# Patient Record
Sex: FEMALE | Race: WHITE | NOT HISPANIC OR LATINO | Employment: FULL TIME | URBAN - METROPOLITAN AREA
[De-identification: names, ages, dates, MRNs, and addresses within clinical notes are randomized per-mention and may not be internally consistent; named-entity substitution may affect disease eponyms.]

---

## 2017-10-06 ENCOUNTER — APPOINTMENT (OUTPATIENT)
Dept: WOMENS IMAGING | Facility: HOSPITAL | Age: 57
End: 2017-10-06

## 2017-10-06 PROCEDURE — 77067 SCR MAMMO BI INCL CAD: CPT | Performed by: RADIOLOGY

## 2017-10-06 PROCEDURE — G0202 SCR MAMMO BI INCL CAD: HCPCS | Performed by: RADIOLOGY

## 2018-10-10 ENCOUNTER — APPOINTMENT (OUTPATIENT)
Dept: WOMENS IMAGING | Facility: HOSPITAL | Age: 58
End: 2018-10-10

## 2018-10-10 PROCEDURE — 77067 SCR MAMMO BI INCL CAD: CPT | Performed by: RADIOLOGY

## 2018-12-17 VITALS
HEART RATE: 73 BPM | RESPIRATION RATE: 22 BRPM | RESPIRATION RATE: 20 BRPM | DIASTOLIC BLOOD PRESSURE: 71 MMHG | SYSTOLIC BLOOD PRESSURE: 115 MMHG | RESPIRATION RATE: 15 BRPM | DIASTOLIC BLOOD PRESSURE: 59 MMHG | DIASTOLIC BLOOD PRESSURE: 60 MMHG | SYSTOLIC BLOOD PRESSURE: 133 MMHG | HEART RATE: 100 BPM | RESPIRATION RATE: 16 BRPM | OXYGEN SATURATION: 97 % | SYSTOLIC BLOOD PRESSURE: 110 MMHG | TEMPERATURE: 97.6 F | DIASTOLIC BLOOD PRESSURE: 80 MMHG | RESPIRATION RATE: 18 BRPM | DIASTOLIC BLOOD PRESSURE: 57 MMHG | DIASTOLIC BLOOD PRESSURE: 70 MMHG | RESPIRATION RATE: 17 BRPM | OXYGEN SATURATION: 96 % | HEART RATE: 80 BPM | WEIGHT: 152 LBS | SYSTOLIC BLOOD PRESSURE: 113 MMHG | SYSTOLIC BLOOD PRESSURE: 95 MMHG | HEIGHT: 62 IN | HEART RATE: 79 BPM | HEART RATE: 105 BPM | SYSTOLIC BLOOD PRESSURE: 122 MMHG | OXYGEN SATURATION: 100 % | HEART RATE: 78 BPM | OXYGEN SATURATION: 98 % | HEART RATE: 82 BPM | DIASTOLIC BLOOD PRESSURE: 54 MMHG | SYSTOLIC BLOOD PRESSURE: 99 MMHG | DIASTOLIC BLOOD PRESSURE: 73 MMHG

## 2018-12-18 PROBLEM — Z12.11 SCREENING FOR COLONIC NEOPLASIA: Status: ACTIVE | Noted: 2018-12-19

## 2018-12-19 ENCOUNTER — OFFICE (AMBULATORY)
Dept: URBAN - METROPOLITAN AREA PATHOLOGY 4 | Facility: PATHOLOGY | Age: 58
End: 2018-12-19

## 2018-12-19 ENCOUNTER — AMBULATORY SURGICAL CENTER (AMBULATORY)
Dept: URBAN - METROPOLITAN AREA SURGERY 17 | Facility: SURGERY | Age: 58
End: 2018-12-19

## 2018-12-19 DIAGNOSIS — D12.5 BENIGN NEOPLASM OF SIGMOID COLON: ICD-10-CM

## 2018-12-19 DIAGNOSIS — Z12.11 ENCOUNTER FOR SCREENING FOR MALIGNANT NEOPLASM OF COLON: ICD-10-CM

## 2018-12-19 DIAGNOSIS — K64.8 OTHER HEMORRHOIDS: ICD-10-CM

## 2018-12-19 LAB
GI HISTOLOGY: A. UNSPECIFIED: (no result)
GI HISTOLOGY: PDF REPORT: (no result)

## 2018-12-19 PROCEDURE — 45385 COLONOSCOPY W/LESION REMOVAL: CPT | Mod: 33 | Performed by: INTERNAL MEDICINE

## 2018-12-19 PROCEDURE — 88305 TISSUE EXAM BY PATHOLOGIST: CPT | Mod: 33 | Performed by: INTERNAL MEDICINE

## 2019-12-04 ENCOUNTER — APPOINTMENT (OUTPATIENT)
Dept: WOMENS IMAGING | Facility: HOSPITAL | Age: 59
End: 2019-12-04

## 2019-12-04 PROCEDURE — 77067 SCR MAMMO BI INCL CAD: CPT | Performed by: RADIOLOGY

## 2019-12-04 PROCEDURE — 77063 BREAST TOMOSYNTHESIS BI: CPT | Performed by: RADIOLOGY

## 2020-03-13 ENCOUNTER — OFFICE VISIT (OUTPATIENT)
Dept: ORTHOPEDIC SURGERY | Facility: CLINIC | Age: 60
End: 2020-03-13

## 2020-03-13 VITALS — TEMPERATURE: 98.5 F | HEIGHT: 62 IN | WEIGHT: 176.4 LBS | BODY MASS INDEX: 32.46 KG/M2

## 2020-03-13 DIAGNOSIS — M75.01 ADHESIVE CAPSULITIS OF BOTH SHOULDERS: ICD-10-CM

## 2020-03-13 DIAGNOSIS — M25.512 BILATERAL SHOULDER PAIN, UNSPECIFIED CHRONICITY: Primary | ICD-10-CM

## 2020-03-13 DIAGNOSIS — M75.02 ADHESIVE CAPSULITIS OF BOTH SHOULDERS: ICD-10-CM

## 2020-03-13 DIAGNOSIS — M25.511 BILATERAL SHOULDER PAIN, UNSPECIFIED CHRONICITY: Primary | ICD-10-CM

## 2020-03-13 PROCEDURE — 20610 DRAIN/INJ JOINT/BURSA W/O US: CPT | Performed by: ORTHOPAEDIC SURGERY

## 2020-03-13 PROCEDURE — 99214 OFFICE O/P EST MOD 30 MIN: CPT | Performed by: ORTHOPAEDIC SURGERY

## 2020-03-13 PROCEDURE — 73030 X-RAY EXAM OF SHOULDER: CPT | Performed by: ORTHOPAEDIC SURGERY

## 2020-03-13 RX ORDER — LIDOCAINE HYDROCHLORIDE 20 MG/ML
4 INJECTION, SOLUTION EPIDURAL; INFILTRATION; INTRACAUDAL; PERINEURAL
Status: COMPLETED | OUTPATIENT
Start: 2020-03-13 | End: 2020-03-13

## 2020-03-13 RX ORDER — MELOXICAM 15 MG/1
TABLET ORAL
Qty: 30 TABLET | Refills: 0 | Status: SHIPPED | OUTPATIENT
Start: 2020-03-13 | End: 2020-04-15 | Stop reason: SDUPTHER

## 2020-03-13 RX ORDER — METHYLPREDNISOLONE ACETATE 80 MG/ML
80 INJECTION, SUSPENSION INTRA-ARTICULAR; INTRALESIONAL; INTRAMUSCULAR; SOFT TISSUE
Status: COMPLETED | OUTPATIENT
Start: 2020-03-13 | End: 2020-03-13

## 2020-03-13 RX ORDER — LANOLIN ALCOHOL/MO/W.PET/CERES
1000 CREAM (GRAM) TOPICAL DAILY
COMMUNITY

## 2020-03-13 RX ADMIN — METHYLPREDNISOLONE ACETATE 80 MG: 80 INJECTION, SUSPENSION INTRA-ARTICULAR; INTRALESIONAL; INTRAMUSCULAR; SOFT TISSUE at 09:55

## 2020-03-13 RX ADMIN — METHYLPREDNISOLONE ACETATE 80 MG: 80 INJECTION, SUSPENSION INTRA-ARTICULAR; INTRALESIONAL; INTRAMUSCULAR; SOFT TISSUE at 09:56

## 2020-03-13 RX ADMIN — LIDOCAINE HYDROCHLORIDE 4 ML: 20 INJECTION, SOLUTION EPIDURAL; INFILTRATION; INTRACAUDAL; PERINEURAL at 09:56

## 2020-03-13 RX ADMIN — LIDOCAINE HYDROCHLORIDE 4 ML: 20 INJECTION, SOLUTION EPIDURAL; INFILTRATION; INTRACAUDAL; PERINEURAL at 09:55

## 2020-03-13 NOTE — PROGRESS NOTES
New Bilateral Shoulder      Patient: Olga Laguna        YOB: 1960    Medical Record Number: [id number]        Chief Complaints: bilateral shoulders       History of Present Illness: This is a 59-year-old female who presents complaining of bilateral shoulder pain left is worse than right she is left-hand dominant they have been ongoing for 6 months no history injury change in activity no history of similar symptoms she does have night pain she cannot begin to get her bra on no history of similar symptoms symptoms are moderate intermittent aching burning worse with range of motion somewhat better with rest she is a manager of a dental office past medical history is unremarkable      Allergies: No Known Allergies    Medications:   Home Medications:  Current Outpatient Medications on File Prior to Visit   Medication Sig   • Cholecalciferol (VITAMIN D3) 125 MCG (5000 UT) capsule capsule Take 5,000 Units by mouth Daily.   • vitamin B-12 (CYANOCOBALAMIN) 1000 MCG tablet Take 1,000 mcg by mouth Daily.   • Codeine Polt-Chlorphen Polt ER 14.7-2.8 MG/5ML Suspension Extended Release Take 10 mL by mouth 2 (Two) Times a Day As Needed (COUGH).     No current facility-administered medications on file prior to visit.      Current Medications:  Scheduled Meds:  Continuous Infusions:  No current facility-administered medications for this visit.   PRN Meds:.    History reviewed. No pertinent past medical history.   History reviewed. No pertinent surgical history.     Social History     Occupational History   • Not on file   Tobacco Use   • Smoking status: Never Smoker   Substance and Sexual Activity   • Alcohol use: No   • Drug use: No   • Sexual activity: Defer      Social History     Social History Narrative   • Not on file      History reviewed. No pertinent family history.          Review of Systems: 14 point review of systems are remarkable for the pertinent positives listed in the chart by the patient the  "remainder negative    Review of Systems      Physical Exam: 59 y.o. female  General Appearance:    Alert, cooperative, in no acute distress                   Vitals:    03/13/20 0923   Temp: 98.5 °F (36.9 °C)   Weight: 80 kg (176 lb 6.4 oz)   Height: 157.5 cm (62\")      Patient is alert and read ×3 no acute distress appears her above-listed at height weight and age.  Affect is normal respiratory rate is normal unlabored. Heart rate regular rate rhythm, sclera, dentition and hearing are normal for the purpose of this exam.    Ortho Exam  Physical exam of the right shoulder reveals no overlying skin changes no lymphedema no lymphadenopathy.  Patient has active flexion 170 with mild symptoms abduction is similar external rotation is to 45 and internal rotation to the lower lumbar spine with mild symptoms.  Patient has good rotator cuff strength 4+ over 5 with isometric strength testing with pain.  Patient has a positive impingement and a positive Smart sign.  Patient has good cervical range of motion which is full and asymptomatic no radicular symptoms.  Patient has a normal elbow exam.  Good distal pulses are present  Patient has pain with overhead activity and a positive Neer sign and a positive empty can sign , a positive drop arm and a definitive painful arc    Physical exam of the left shoulder reveals no overlying skin changes no lymphedema no lymphadenopathy.  Patient has active flexion 160 with mild symptoms abduction is similar external rotation is to 35 and internal rotation to the left buttock with mild symptoms.  Patient has good rotator cuff strength 4+ over 5 with isometric strength testing with pain.  Patient has a positive impingement and a positive Smart sign.  Patient has good cervical range of motion which is full and asymptomatic no radicular symptoms.  Patient has a normal elbow exam.  Good distal pulses are presentPatient has pain with overhead activity and a positive Neer sign and a positive " empty can sign  They have a positive drop arm any definitive painful arc    Large Joint Arthrocentesis: L glenohumeral  Date/Time: 3/13/2020 9:55 AM  Consent given by: patient  Site marked: site marked  Timeout: Immediately prior to procedure a time out was called to verify the correct patient, procedure, equipment, support staff and site/side marked as required   Supporting Documentation  Indications: pain   Procedure Details  Location: shoulder - L glenohumeral  Preparation: Patient was prepped and draped in the usual sterile fashion  Needle size: 22 G  Approach: posterior  Medications administered: 80 mg methylPREDNISolone acetate 80 MG/ML; 4 mL lidocaine PF 2% 2 %  Patient tolerance: patient tolerated the procedure well with no immediate complications    Large Joint Arthrocentesis: R glenohumeral  Date/Time: 3/13/2020 9:56 AM  Consent given by: patient  Site marked: site marked  Timeout: Immediately prior to procedure a time out was called to verify the correct patient, procedure, equipment, support staff and site/side marked as required   Supporting Documentation  Indications: pain   Procedure Details  Location: shoulder - R glenohumeral  Preparation: Patient was prepped and draped in the usual sterile fashion  Needle size: 22 G  Approach: posterior  Medications administered: 4 mL lidocaine PF 2% 2 %; 80 mg methylPREDNISolone acetate 80 MG/ML  Patient tolerance: patient tolerated the procedure well with no immediate complications                Radiology:   AP, Scapular Y and Axillary Lateral of the right and left shoulder were ordered/reviewed to evauate shoulder pain.  I have no comparative films she has some mild acromioclavicular arthritis otherwise no acute bony pathology is seen  Imaging Results (Most Recent)     Procedure Component Value Units Date/Time    XR Shoulder 2+ View Bilateral [[id number]] Resulted:  03/13/20 0900     Updated:  03/13/20 0911    Impression:       Ordering physician's impression is  located in the Encounter Note dated 03/13/20. X-ray performed in the DR room.          Assessment/Plan: Bilateral shoulder pain I think this is frozen shoulder the left is worse than the right had a long discussion with her regarding the pathology.  Her sister has had a similar problem so she kind of does understand this.  Plan is to proceed with an injection of both I will start her on meloxicam with strict precautions for short period of time and have her see physical therapy.  I will see her back for 5 weeks  Cortisone Injection. See procedure note.  Cortisone Injection for DIAGNOSTIC and THERAPUTIC purposes.

## 2020-04-15 RX ORDER — MELOXICAM 15 MG/1
TABLET ORAL
Qty: 30 TABLET | Refills: 0 | Status: SHIPPED | OUTPATIENT
Start: 2020-04-15 | End: 2021-08-05

## 2021-02-05 ENCOUNTER — IMMUNIZATION (OUTPATIENT)
Dept: VACCINE CLINIC | Facility: HOSPITAL | Age: 61
End: 2021-02-05

## 2021-02-05 ENCOUNTER — APPOINTMENT (OUTPATIENT)
Dept: WOMENS IMAGING | Facility: HOSPITAL | Age: 61
End: 2021-02-05

## 2021-02-05 PROCEDURE — 0001A: CPT | Performed by: INTERNAL MEDICINE

## 2021-02-05 PROCEDURE — 91300 HC SARSCOV02 VAC 30MCG/0.3ML IM: CPT | Performed by: INTERNAL MEDICINE

## 2021-02-05 PROCEDURE — 77067 SCR MAMMO BI INCL CAD: CPT | Performed by: RADIOLOGY

## 2021-02-05 PROCEDURE — 77063 BREAST TOMOSYNTHESIS BI: CPT | Performed by: RADIOLOGY

## 2021-02-26 ENCOUNTER — IMMUNIZATION (OUTPATIENT)
Dept: VACCINE CLINIC | Facility: HOSPITAL | Age: 61
End: 2021-02-26

## 2021-02-26 PROCEDURE — 91300 HC SARSCOV02 VAC 30MCG/0.3ML IM: CPT | Performed by: INTERNAL MEDICINE

## 2021-02-26 PROCEDURE — 0002A: CPT | Performed by: INTERNAL MEDICINE

## 2021-03-19 ENCOUNTER — APPOINTMENT (OUTPATIENT)
Dept: WOMENS IMAGING | Facility: HOSPITAL | Age: 61
End: 2021-03-19

## 2021-03-19 PROCEDURE — 76641 ULTRASOUND BREAST COMPLETE: CPT | Performed by: RADIOLOGY

## 2021-03-19 PROCEDURE — 77061 BREAST TOMOSYNTHESIS UNI: CPT | Performed by: RADIOLOGY

## 2021-03-19 PROCEDURE — 77065 DX MAMMO INCL CAD UNI: CPT | Performed by: RADIOLOGY

## 2021-03-19 PROCEDURE — G0279 TOMOSYNTHESIS, MAMMO: HCPCS | Performed by: RADIOLOGY

## 2021-04-05 ENCOUNTER — APPOINTMENT (OUTPATIENT)
Dept: WOMENS IMAGING | Facility: HOSPITAL | Age: 61
End: 2021-04-05

## 2021-04-05 PROCEDURE — 19082 BX BREAST ADD LESION STRTCTC: CPT | Performed by: RADIOLOGY

## 2021-04-05 PROCEDURE — 19081 BX BREAST 1ST LESION STRTCTC: CPT | Performed by: RADIOLOGY

## 2021-04-08 ENCOUNTER — TELEPHONE (OUTPATIENT)
Dept: SURGERY | Facility: CLINIC | Age: 61
End: 2021-04-08

## 2021-04-08 NOTE — TELEPHONE ENCOUNTER
Scheduled New Pt appointment with  6-11-21  Arrive 9:00am    Mailed Paper work    LM on patient's phone.    Mony

## 2021-04-28 ENCOUNTER — TELEPHONE (OUTPATIENT)
Dept: SURGERY | Facility: CLINIC | Age: 61
End: 2021-04-28

## 2021-04-28 ENCOUNTER — TRANSCRIBE ORDERS (OUTPATIENT)
Dept: SURGERY | Facility: CLINIC | Age: 61
End: 2021-04-28

## 2021-04-28 DIAGNOSIS — R92.8 ABNORMAL FINDING ON BREAST IMAGING: Primary | ICD-10-CM

## 2021-04-28 NOTE — TELEPHONE ENCOUNTER
Scheduled Right Breast limited U/S at [facility]   5-3-21 at 9:30    Return to see Dr LAY/ [facility] PT 5-6-21 arrive 12:00    Spoke to pt leslee Sykes

## 2021-04-30 ENCOUNTER — TELEPHONE (OUTPATIENT)
Dept: SURGERY | Facility: CLINIC | Age: 61
End: 2021-04-30

## 2021-04-30 ENCOUNTER — PREP FOR SURGERY (OUTPATIENT)
Dept: OTHER | Facility: HOSPITAL | Age: 61
End: 2021-04-30

## 2021-04-30 ENCOUNTER — TRANSCRIBE ORDERS (OUTPATIENT)
Dept: SURGERY | Facility: CLINIC | Age: 61
End: 2021-04-30

## 2021-04-30 ENCOUNTER — APPOINTMENT (OUTPATIENT)
Dept: WOMENS IMAGING | Facility: HOSPITAL | Age: 61
End: 2021-04-30

## 2021-04-30 DIAGNOSIS — R92.8 ABNORMAL FINDING ON BREAST IMAGING: Primary | ICD-10-CM

## 2021-04-30 DIAGNOSIS — L02.91 ABSCESS: Primary | ICD-10-CM

## 2021-04-30 PROCEDURE — 76642 ULTRASOUND BREAST LIMITED: CPT | Performed by: RADIOLOGY

## 2021-04-30 PROCEDURE — 19000 PUNCTURE ASPIR CYST BREAST: CPT | Performed by: RADIOLOGY

## 2021-04-30 NOTE — TELEPHONE ENCOUNTER
----- Message from Mony Lopez sent at 4/30/2021  8:11 AM EDT -----  Regarding: New PT  Dr. Clara Pallares referred a patient with a Right Radial Scar, we are seeing her next Wednesday, after a Right Breast US at [facility] on Monday 5-3-21    Dr Pallares said patient called her this morning in severe pain, wanted to know if we could reach out to the patient and get her seen sooner.Dr.Pallares said she has not seen the patient for this issue. She would like 's MA to call pt.    I have moved up patient's Right Breast US at [facility] for today at 10:30  I have left patient a message to call us.          I've left message for patient to please call me back. I've also left message with Dr. Pallares's office trying to reach patient, getting voicemail.   l

## 2021-04-30 NOTE — TELEPHONE ENCOUNTER
Moved up Right Breast US at [facility]  4-30-21 AT 10:30    LM X 2  For pt to call.    Mony

## 2021-05-03 ENCOUNTER — PREP FOR SURGERY (OUTPATIENT)
Dept: OTHER | Facility: HOSPITAL | Age: 61
End: 2021-05-03

## 2021-05-03 ENCOUNTER — OFFICE VISIT (OUTPATIENT)
Dept: SURGERY | Facility: CLINIC | Age: 61
End: 2021-05-03

## 2021-05-03 ENCOUNTER — TELEPHONE (OUTPATIENT)
Dept: SURGERY | Facility: CLINIC | Age: 61
End: 2021-05-03

## 2021-05-03 VITALS
TEMPERATURE: 97.5 F | WEIGHT: 172 LBS | DIASTOLIC BLOOD PRESSURE: 74 MMHG | SYSTOLIC BLOOD PRESSURE: 128 MMHG | HEIGHT: 62 IN | BODY MASS INDEX: 31.65 KG/M2 | HEART RATE: 80 BPM | OXYGEN SATURATION: 98 %

## 2021-05-03 DIAGNOSIS — N64.89 RADIAL SCAR OF RIGHT BREAST: Primary | ICD-10-CM

## 2021-05-03 DIAGNOSIS — Z80.3 FH: BREAST CANCER: ICD-10-CM

## 2021-05-03 DIAGNOSIS — N64.89 HEMATOMA OF RIGHT BREAST: ICD-10-CM

## 2021-05-03 DIAGNOSIS — B37.31 YEAST VAGINITIS: ICD-10-CM

## 2021-05-03 DIAGNOSIS — N60.11 FIBROCYSTIC DISEASE OF RIGHT BREAST: ICD-10-CM

## 2021-05-03 DIAGNOSIS — R92.8 ABNORMAL MAMMOGRAM: ICD-10-CM

## 2021-05-03 DIAGNOSIS — E66.09 CLASS 1 OBESITY DUE TO EXCESS CALORIES WITHOUT SERIOUS COMORBIDITY WITH BODY MASS INDEX (BMI) OF 31.0 TO 31.9 IN ADULT: ICD-10-CM

## 2021-05-03 PROCEDURE — 99244 OFF/OP CNSLTJ NEW/EST MOD 40: CPT | Performed by: SURGERY

## 2021-05-03 RX ORDER — CEPHALEXIN 500 MG/1
CAPSULE ORAL
COMMUNITY
Start: 2021-04-30 | End: 2021-08-05

## 2021-05-03 RX ORDER — ONDANSETRON 4 MG/1
4 TABLET, FILM COATED ORAL EVERY 8 HOURS PRN
Qty: 10 TABLET | Refills: 1 | Status: SHIPPED | OUTPATIENT
Start: 2021-05-03 | End: 2021-08-05

## 2021-05-03 RX ORDER — CEFAZOLIN SODIUM 2 G/100ML
2 INJECTION, SOLUTION INTRAVENOUS ONCE
Status: CANCELLED | OUTPATIENT
Start: 2021-11-10 | End: 2021-05-03

## 2021-05-03 RX ORDER — FLUCONAZOLE 150 MG/1
150 TABLET ORAL ONCE
Qty: 1 TABLET | Refills: 2 | Status: SHIPPED | OUTPATIENT
Start: 2021-05-03 | End: 2021-05-03

## 2021-05-03 RX ORDER — DIAZEPAM 5 MG/1
5 TABLET ORAL ONCE
Status: CANCELLED | OUTPATIENT
Start: 2021-11-10 | End: 2021-05-03

## 2021-05-03 RX ORDER — HYDROCODONE BITARTRATE AND ACETAMINOPHEN 5; 325 MG/1; MG/1
TABLET ORAL
Qty: 15 TABLET | Refills: 0 | Status: SHIPPED | OUTPATIENT
Start: 2021-05-03 | End: 2021-08-05

## 2021-05-03 RX ORDER — SODIUM CHLORIDE, SODIUM LACTATE, POTASSIUM CHLORIDE, CALCIUM CHLORIDE 600; 310; 30; 20 MG/100ML; MG/100ML; MG/100ML; MG/100ML
100 INJECTION, SOLUTION INTRAVENOUS CONTINUOUS
Status: CANCELLED | OUTPATIENT
Start: 2021-11-10

## 2021-05-03 RX ORDER — POLYETHYLENE GLYCOL 3350 17 G/17G
17 POWDER, FOR SOLUTION ORAL DAILY
Qty: 119 G | Refills: 0 | Status: SHIPPED | OUTPATIENT
Start: 2021-05-03 | End: 2021-08-05

## 2021-05-03 NOTE — H&P
There are no changes in the history or physical exam, aside from any that are listed as an addendum at the bottom of this document.   Today, patient will go for the surgery listed in the plan below.    Chief Complaint: Genevieve Laguna is a 61 y.o. female who was seen in consultation at the request of Maria Fernanda Murillo MD  for hematoma  and abnormal breast imaging    History of Present Illness:  Patient presents with right breast hematoma, abnormal breast imaging and family history breast cancer. She noted no new masses, skin changes, nipple discharge, nipple changes prior to her most recent imaging.  Her most recent imaging includes the followin2019 BILATERAL SCREENING MAMMO WITH JOANN              [facility]     GENEVIEVE MANUEL  Yearly screening mammogram. Scattered areas of fibroglandular density.  BI-RADS Category 1: Negative.    2021 BILATERAL SCREENING MAMMO WITH JOANN          [facility]               GENEVIEVE LAGUNA  Scattered areas of fibroglandular density. Possible area of architectural distortion seen in middle one-third central region of the right breast.  BI-RADS Category 0: Incomplete.    2021   RIGHT DIAGNOSTIC MMG WITH JOANN & RIGHT BREAST US    [facility]    GENEVIEVE LAGUNA  MMG:  Scattered areas of fibroglandular density.  Finding 1. Right breast, central. There is an area of architectural distortion measuring approximately 10 to 20 mm in the middle one-third central region of the right breast.  Finding 2. Focal asymmetry in the anterior one-third upper outer region of the right breast.  US:  Finding 1. There is no sonographic correlate.  Finding 2. There is no sonographic correlate.  IMPRESSION:  Finding 1. Suspicious.  Finding 2. Suspicious.  BI-RADS Category 4B.        She had a biopsy on the following day that showed:   2021 STEREOTACTIC BIOPSIES                              [facility]                    GENEVIEVE LAGUNA  Right breast. 13 core specimens. 9 gauge Eviva. Top hat shaped  s-marco Eviva biopsy clip was deployed. A two view mammogram shows the clip in the expected location. Pathology returned as radial scar. Pathology is benign but higher risk and concordant.  Right breast. 10 core specimens. 9 gauge Eviva. Mini cork shaped s-marco Eviva biopsy clip was deployed. A two view mammogram shows the clip in expected location. There is a prominent hematoma at this site measuring 4 to 5 cm. Pathology returned as fibrocystic changes and florid hyperplasia. Pathology is benign and concordant.    04/05/2021 PATHOLOGY REPORT      [facility]       GENEVIEVE JACKSON  1. Right Distal Center:  Radial scar.  2. Right Upper Outer Quadrant:  Fibrocystic changes with florid hyperplasia. Negative for carcinoma.    I then arranged for a US at [facility] due to the hematoma;    04/30/2021 RIGHT BREAST US             [facility]                    GENEVIEVE JACKSON  CURRENT COMPLAINT/SYMPTOMS:  Seen for palpable lump.  FINDINGS:  There is an irregular large area of mixed echogenicity with poorly defined margins seen in the upper outer region of the right breast. This area is most consistent with a hematoma that extends from the anterior upper outer and lateral region, deeper into the central breast. Hematoma indicated by minicork shaped marker clip.  On current ultrasound images this measures 6.7 x 5.9 x 4.4 cm. The central biopsy site, t-shaped clip, site of biopsy identified radial scar, demonstrated a smaller hematoma, and that biopsy site is currently obscured by the larger hematoma.  BI-RADS Category 2.        [facility].  She has her uterus and ovaries, is postmenopausal as of age 47, and takes nor hormones.  Her family history includes the following: She has 3 sisters, 3 maternal aunts, one paternal aunt, and no children.  Her mother had breast cancer at age 67 and she has always attributed this to her mother taking injectable  hormone replacement menopause which she also had early around mid 40s.  She is here for evaluation.    Review of Systems:  Review of Systems   HENT:   Positive for hearing loss.    All other systems reviewed and are negative.       Past Medical and Surgical History:  Breast Biopsy History:  Patient has had the following breast biopsies:left many years ago bengin, right 4/5/21 stereotactic, Radial scar.  Breast Cancer HIstory:  Patient does not have a past medical history of breast cancer.  Breast Operations, and year:  None   Obstetric/Gynecologic History:  Age menstrual periods began: 14  Patient is postmenopausal, entered menopause naturally at age:  47   Number of pregnancies:1  Number of live births: 0  Number of abortions or miscarriages: 1  Age of delivery of first child: n/a   Patient did not breast feed.  Length of time taking birth control pills: 4 yrs   Patient has never taken hormone replacement    Patient has both ovaries and uterus.     Past Surgical History:   Procedure Laterality Date    BREAST BIOPSY      BREAST BIOPSY      DIAGNOSTIC LAPAROSCOPY      SKIN BIOPSY      pre-melanoma       Past Medical History:   Diagnosis Date    H/O endometritis     Hearing loss     Hematoma     Radial scar of right breast     Vitamin D deficiency        Prior Hospitalizations, other than for surgery or childbirth, and year:  None     Social History     Socioeconomic History    Marital status: Unknown     Spouse name: Not on file    Number of children: Not on file    Years of education: Not on file    Highest education level: Not on file   Tobacco Use    Smoking status: Never Smoker    Smokeless tobacco: Never Used   Vaping Use    Vaping Use: Never used   Substance and Sexual Activity    Alcohol use: No    Drug use: No    Sexual activity: Defer     Patient is .  Patient is employed full time with the following occupation: dental hygenist/practice manager  Patient drinks 1 (tea, not q. day) servings of caffeine  "per day.    Family History:  Family History   Problem Relation Age of Onset    Breast cancer Mother 67    Basal cell carcinoma Mother     Cancer Father         bladder cancer        Vital Signs:  /74   Pulse 80   Temp 97.5 °F (36.4 °C)   Ht 157.5 cm (62\")   Wt 78 kg (172 lb)   LMP  (LMP Unknown)   SpO2 98%   Breastfeeding No   BMI 31.46 kg/m²      Medications:    Current Outpatient Medications:     cephalexin (KEFLEX) 500 MG capsule, TAKE 1 CAPSULE BY MOUTH THREE TIMES A DAY FOR 10 DAYS, Disp: , Rfl:     Cholecalciferol (VITAMIN D3) 125 MCG (5000 UT) capsule capsule, Take 5,000 Units by mouth Daily., Disp: , Rfl:     meloxicam (MOBIC) 15 MG tablet, 1 PO Daily with food., Disp: 30 tablet, Rfl: 0    vitamin B-12 (CYANOCOBALAMIN) 1000 MCG tablet, Take 1,000 mcg by mouth Daily., Disp: , Rfl:     HYDROcodone-acetaminophen (NORCO) 5-325 MG per tablet, 1-2 tabs po q 4-6hr prn pain, Disp: 15 tablet, Rfl: 0    ondansetron (ZOFRAN) 4 MG tablet, Take 1 tablet by mouth Every 8 (Eight) Hours As Needed for Nausea or Vomiting. May take 8 mg po q8hr prn is 4mg is not effective, Disp: 10 tablet, Rfl: 1    polyethylene glycol (MiraLax) 17 GM/SCOOP powder, Take 17 g by mouth Daily. Take cap of powder with 8oz water daily surrounding surgery and while on narcotic, Disp: 119 g, Rfl: 0     Allergies:  Allergies   Allergen Reactions    Latex Rash     blisters    Codeine GI Intolerance       Physical Examination:  /74   Pulse 80   Temp 97.5 °F (36.4 °C)   Ht 157.5 cm (62\")   Wt 78 kg (172 lb)   LMP  (LMP Unknown)   SpO2 98%   Breastfeeding No   BMI 31.46 kg/m²   General Appearance:  Patient is in no distress.  She is well kept and has an obese build.   Psychiatric:  Patient with appropriate mood and affect. Alert and oriented to self, time, and place.    Breast, RIGHT:  large sized, asymmetric with the contralateral side due to traumatic hematoma.  There is a tender firm hematoma occupying the majority of the " central breast. There is no skin necrosis or ischemia. Mammotomy lateral/UOQ with a small eschar.  Breast skin is without erythema, edema, rashes.   There is no nipple retraction, discharge or nipple/areolar skin changes.There are no obvious other masses palpable in the sitting or supine positions but the exam is limited today due to the hematoma..    Breast, LEFT:  medium sized, 36B asymmetric with the contralateral side as above.  Breast skin is without erythema, edema, rashes.  There are no visible abnormalities upon inspection during the arm-raising maneuver or with hands on hips in the sitting position. There is no nipple retraction, discharge or nipple/areolar skin changes.There are no masses palpable in the sitting or supine positions.    Lymphatic:  There is no axillary, cervical, infraclavicular, or supraclavicular adenopathy bilaterally.  Eyes:  Pupils are round and reactive to light.  Cardiovascular:  Heart rate and rhythm are regular.  Respiratory:  Lungs are clear bilaterally with no crackles or wheezes in any lung field.  Gastrointestinal:  Abdomen is soft, nondistended, and nontender.     Musculoskeletal:  Good strength in all 4 extremities.   There is good range of motion in both shoulders.    Skin:  No new skin lesions or rashes on the skin excluding the breast (see breast exam above).        Imagin2019 BILATERAL SCREENING MAMMO WITH JOANN              [facility]     GENEVIEVE JACKSON  Yearly screening mammogram. Scattered areas of fibroglandular density.  BI-RADS Category 1: Negative.    2021 BILATERAL SCREENING MAMMO WITH JOANN          [facility]               GENEVIEVE JACKSON  Scattered areas of fibroglandular density. Possible area of architectural distortion seen in middle one-third central region of the right breast.  BI-RADS Category 0: Incomplete.    2021   RIGHT DIAGNOSTIC MMG WITH JOANN & RIGHT BREAST US    [facility]    GENEVIEVE JACKSON  MMG:  Scattered areas of fibroglandular  density.  Finding 1. Right breast, central. There is an area of architectural distortion measuring approximately 10 to 20 mm in the middle one-third central region of the right breast.  Finding 2. Focal asymmetry in the anterior one-third upper outer region of the right breast.  US:  Finding 1. There is no sonographic correlate.  Finding 2. There is no sonographic correlate.  IMPRESSION:  Finding 1. Suspicious.  Finding 2. Suspicious.  BI-RADS Category 4B.    04/30/2021 RIGHT BREAST US             [facility]                    GENEVIEVE JACKSON  CURRENT COMPLAINT/SYMPTOMS:  Seen for palpable lump.  FINDINGS:  There is an irregular large area of mixed echogenicity with poorly defined margins seen in the upper outer region of the right breast. This area is most consistent with a hematoma that extends from the anterior upper outer and lateral region, deeper into the central breast. Hematoma indicated by minicork shaped marker clip.  On current ultrasound images this measures 6.7 x 5.9 x 4.4 cm. The central biopsy site, t-shaped clip, site of biopsy identified radial scar, demonstrated a smaller hematoma, and that biopsy site is currently obscured by the larger hematoma.  BI-RADS Category 2.    Pathology:  04/05/2021 STEREOTACTIC BIOPSIES                              [facility]                    GENEVIEVE JACKSON  Right breast. 13 core specimens. 9 gauge Eviva. Top hat shaped s-marco Eviva biopsy clip was deployed. A two view mammogram shows the clip in the expected location. Pathology returned as radial scar. Pathology is benign but higher risk and concordant.  Right breast. 10 core specimens. 9 gauge Eviva. Mini cork shaped s-marco Eviva biopsy clip was deployed. A two view mammogram shows the clip in expected location. There is a prominent hematoma at this site measuring 4 to 5 cm. Pathology returned as fibrocystic changes and florid hyperplasia. Pathology is benign and concordant.    04/05/2021 PATHOLOGY REPORT      [facility]        GENEVIEVE JACKSON  1. Right Distal Center:  Radial scar.  2. Right Upper Outer Quadrant:  Fibrocystic changes with florid hyperplasia. Negative for carcinoma.    Procedures:      Assessment:   Diagnosis Plan   1. Radial scar of right breast  HYDROcodone-acetaminophen (NORCO) 5-325 MG per tablet   2. Abnormal mammogram     3. Fibrocystic disease of right breast     4. Hematoma of right breast  HYDROcodone-acetaminophen (NORCO) 5-325 MG per tablet   5. FH: breast cancer     6. Class 1 obesity due to excess calories without serious comorbidity with body mass index (BMI) of 31.0 to 31.9 in adult     7. Yeast vaginitis     1-2  RIGHT breast 10-20mm, mid third central architectural distortion with no US correlate-  Radial scar- tophat marker good position.      2-3  RIGHT UOQ- CORK marker- FCC, concordant and good position marker- 6.7 cm hematoma post biopsy    4-  RIGHT breast 6.7 cm hematoma at the benign biopsy site with cork marker    5-  Mother age 67  [employer] Breast surgery lifetime risk- 20-34%    MBS 5 year risk: 4-7%    6-  BMI 31  - 30 # weight gain with menopause    7-  Reports yeast infection on keflex from hematoma    Plan:  THe patient goes by Nabil.    We reviewed her imaging, history, exam, pathology, and family history together today.     We reviewed the nature of radial scar and that we prefer to excise these to rule out a pathologic upgrade i.e. that we could have missed a cancer on the core biopsy and also to alleviate be abnormal imaging finding for the radiologist next interpretation.  We discussed the procedure of a right breast needle localized excision of radial scar.  We discussed the alternative of not excising.  We were both in agreement on excision.  I recommended doing this towards the end of August or September to give her hematoma time to resolve.  Based on her work she preferred to do this in November.    Her most significant risk factors for breast cancer include the  following:nulliparity, family history, obesity.    I calculated her lifetime risk of breast cancer using the [employer] of breast surgeons risk assessment as: 20-34% and 5 year risk as 4-7%.    With a lifetime risk of breast cancer greater than 20%, the current recommendations for screening include annual mammography and annual MRI, with clinical examination. She is interested in pursuing this, but wants to wait until her next mamm[facility], so will plan for [facility] MRI at same time.    We also discussed that for a 5 year risk greater than 1.7% or LCIS, and a life expectancy > 10 years, that we recommend risk reduction counseling with the medical oncologists about chemopreventive agents such as tamoxifen, raloxifene, or exemestane.  She was not interested in pursuing thisand is going to work on a healthy diet and exercise.  SHe politely declined an offer to speak with our nutritionist as well.    We will see her back in November for her procedure.    I have called in her Rx and asked her to pick these up.    Recommended supportive postop bra and continued warm compresses to breast. We showed her examples of and fitted her for a postop bra today. We discussed that it may take 4-6 months for the hematoma to resolve. THis was fortunately at the benign biopsy site rather than at the radial scar.    With regards to the yeast infection, I have called in diflucan 150 mg and asked her to take a BID probiotic.    Cha Mane MD        We have spent 60 minutes in face to face visit today, 45 in face to face .      Next Appointment:  Return for surgery.      EMR Dragon/transcription disclaimer:    Much of this encounter note is an electronic transcription/translocation of spoken language to printed text.  The electronic translation of spoken language may permit erroneous, or at times, nonsensical words or phrases to be inadvertently transcribed.  Although I have reviewed the note from such areas, some  may still exist.        Encounter Administratively Closed by Health Information Management

## 2021-05-03 NOTE — PROGRESS NOTES
Chief Complaint: Genevieve Laguna is a 61 y.o. female who was seen in consultation at the request of Maria Fernanda Murillo MD  for hematoma  and abnormal breast imaging    History of Present Illness:  Patient presents with right breast hematoma, abnormal breast imaging and family history breast cancer. She noted no new masses, skin changes, nipple discharge, nipple changes prior to her most recent imaging.  Her most recent imaging includes the followin2019 BILATERAL SCREENING MAMMO WITH JOANN              [facility]     GENEVIEVE LAGUNA  Yearly screening mammogram. Scattered areas of fibroglandular density.  BI-RADS Category 1: Negative.    2021 BILATERAL SCREENING MAMMO WITH JOANN          [facility]               GENEVIEVE LAGUNA  Scattered areas of fibroglandular density. Possible area of architectural distortion seen in middle one-third central region of the right breast.  BI-RADS Category 0: Incomplete.    2021   RIGHT DIAGNOSTIC MMG WITH JOANN & RIGHT BREAST US    [facility]    GENEVIEVE LAGUNA  MMG:  Scattered areas of fibroglandular density.  Finding 1. Right breast, central. There is an area of architectural distortion measuring approximately 10 to 20 mm in the middle one-third central region of the right breast.  Finding 2. Focal asymmetry in the anterior one-third upper outer region of the right breast.  US:  Finding 1. There is no sonographic correlate.  Finding 2. There is no sonographic correlate.  IMPRESSION:  Finding 1. Suspicious.  Finding 2. Suspicious.  BI-RADS Category 4B.        She had a biopsy on the following day that showed:   2021 STEREOTACTIC BIOPSIES                              [facility]                    GENEVIEVE LAGUNA  Right breast. 13 core specimens. 9 gauge Eviva. Top hat shaped s-marco Eviva biopsy clip was deployed. A two view mammogram shows the clip in the expected location. Pathology returned as radial scar. Pathology is benign but higher risk and concordant.  Right breast. 10  core specimens. 9 gauge Eviva. Mini cork shaped s-marco Eviva biopsy clip was deployed. A two view mammogram shows the clip in expected location. There is a prominent hematoma at this site measuring 4 to 5 cm. Pathology returned as fibrocystic changes and florid hyperplasia. Pathology is benign and concordant.    04/05/2021 PATHOLOGY REPORT      [facility]       GENEVIEVE JACKSON  1. Right Distal Center:  Radial scar.  2. Right Upper Outer Quadrant:  Fibrocystic changes with florid hyperplasia. Negative for carcinoma.    I then arranged for a US at [facility] due to the hematoma;    04/30/2021 RIGHT BREAST US             [facility]                    GENEVIEVE JACKSON  CURRENT COMPLAINT/SYMPTOMS:  Seen for palpable lump.  FINDINGS:  There is an irregular large area of mixed echogenicity with poorly defined margins seen in the upper outer region of the right breast. This area is most consistent with a hematoma that extends from the anterior upper outer and lateral region, deeper into the central breast. Hematoma indicated by minicork shaped marker clip.  On current ultrasound images this measures 6.7 x 5.9 x 4.4 cm. The central biopsy site, t-shaped clip, site of biopsy identified radial scar, demonstrated a smaller hematoma, and that biopsy site is currently obscured by the larger hematoma.  BI-RADS Category 2.      [facility].  She has her uterus and ovaries, is postmenopausal as of age 47, and takes nor hormones.  Her family history includes the following: She has 3 sisters, 3 maternal aunts, one paternal aunt, and no children.  Her mother had breast cancer at age 67 and she has always attributed this to her mother taking injectable hormone replacement menopause which she also had early around mid 40s.  She is here for evaluation.    Review of Systems:  Review of Systems   HENT:   Positive for hearing loss.    All other systems reviewed  and are negative.       Past Medical and Surgical History:  Breast Biopsy History:  Patient has had the following breast biopsies:left many years ago bengin, right 4/5/21 stereotactic, Radial scar.  Breast Cancer HIstory:  Patient does not have a past medical history of breast cancer.  Breast Operations, and year:  None   Obstetric/Gynecologic History:  Age menstrual periods began: 14  Patient is postmenopausal, entered menopause naturally at age:  47   Number of pregnancies:1  Number of live births: 0  Number of abortions or miscarriages: 1  Age of delivery of first child: n/a   Patient did not breast feed.  Length of time taking birth control pills: 4 yrs   Patient has never taken hormone replacement    Patient has both ovaries and uterus.     Past Surgical History:   Procedure Laterality Date   • BREAST BIOPSY     • BREAST BIOPSY     • DIAGNOSTIC LAPAROSCOPY     • SKIN BIOPSY      pre-melanoma       Past Medical History:   Diagnosis Date   • H/O endometritis    • Hearing loss    • Hematoma    • Radial scar of right breast    • Vitamin D deficiency        Prior Hospitalizations, other than for surgery or childbirth, and year:  None     Social History     Socioeconomic History   • Marital status: Unknown     Spouse name: Not on file   • Number of children: Not on file   • Years of education: Not on file   • Highest education level: Not on file   Tobacco Use   • Smoking status: Never Smoker   • Smokeless tobacco: Never Used   Vaping Use   • Vaping Use: Never used   Substance and Sexual Activity   • Alcohol use: No   • Drug use: No   • Sexual activity: Defer     Patient is .  Patient is employed full time with the following occupation: dental hygenist/practice manager  Patient drinks 1 (tea, not q. day) servings of caffeine per day.    Family History:  Family History   Problem Relation Age of Onset   • Breast cancer Mother 67   • Basal cell carcinoma Mother    • Cancer Father         bladder cancer   "      Vital Signs:  /74   Pulse 80   Temp 97.5 °F (36.4 °C)   Ht 157.5 cm (62\")   Wt 78 kg (172 lb)   LMP  (LMP Unknown)   SpO2 98%   Breastfeeding No   BMI 31.46 kg/m²      Medications:    Current Outpatient Medications:   •  cephalexin (KEFLEX) 500 MG capsule, TAKE 1 CAPSULE BY MOUTH THREE TIMES A DAY FOR 10 DAYS, Disp: , Rfl:   •  Cholecalciferol (VITAMIN D3) 125 MCG (5000 UT) capsule capsule, Take 5,000 Units by mouth Daily., Disp: , Rfl:   •  meloxicam (MOBIC) 15 MG tablet, 1 PO Daily with food., Disp: 30 tablet, Rfl: 0  •  vitamin B-12 (CYANOCOBALAMIN) 1000 MCG tablet, Take 1,000 mcg by mouth Daily., Disp: , Rfl:   •  HYDROcodone-acetaminophen (NORCO) 5-325 MG per tablet, 1-2 tabs po q 4-6hr prn pain, Disp: 15 tablet, Rfl: 0  •  ondansetron (ZOFRAN) 4 MG tablet, Take 1 tablet by mouth Every 8 (Eight) Hours As Needed for Nausea or Vomiting. May take 8 mg po q8hr prn is 4mg is not effective, Disp: 10 tablet, Rfl: 1  •  polyethylene glycol (MiraLax) 17 GM/SCOOP powder, Take 17 g by mouth Daily. Take cap of powder with 8oz water daily surrounding surgery and while on narcotic, Disp: 119 g, Rfl: 0     Allergies:  Allergies   Allergen Reactions   • Latex Rash     blisters   • Codeine GI Intolerance       Physical Examination:  /74   Pulse 80   Temp 97.5 °F (36.4 °C)   Ht 157.5 cm (62\")   Wt 78 kg (172 lb)   LMP  (LMP Unknown)   SpO2 98%   Breastfeeding No   BMI 31.46 kg/m²   General Appearance:  Patient is in no distress.  She is well kept and has an obese build.   Psychiatric:  Patient with appropriate mood and affect. Alert and oriented to self, time, and place.    Breast, RIGHT:  large sized, asymmetric with the contralateral side due to traumatic hematoma.  There is a tender firm hematoma occupying the majority of the central breast. There is no skin necrosis or ischemia. Mammotomy lateral/UOQ with a small eschar.  Breast skin is without erythema, edema, rashes.   There is no nipple " retraction, discharge or nipple/areolar skin changes.There are no obvious other masses palpable in the sitting or supine positions but the exam is limited today due to the hematoma..    Breast, LEFT:  medium sized, 36B asymmetric with the contralateral side as above.  Breast skin is without erythema, edema, rashes.  There are no visible abnormalities upon inspection during the arm-raising maneuver or with hands on hips in the sitting position. There is no nipple retraction, discharge or nipple/areolar skin changes.There are no masses palpable in the sitting or supine positions.    Lymphatic:  There is no axillary, cervical, infraclavicular, or supraclavicular adenopathy bilaterally.  Eyes:  Pupils are round and reactive to light.  Cardiovascular:  Heart rate and rhythm are regular.  Respiratory:  Lungs are clear bilaterally with no crackles or wheezes in any lung field.  Gastrointestinal:  Abdomen is soft, nondistended, and nontender.     Musculoskeletal:  Good strength in all 4 extremities.   There is good range of motion in both shoulders.    Skin:  No new skin lesions or rashes on the skin excluding the breast (see breast exam above).        Imagin2019 BILATERAL SCREENING MAMMO WITH [facility]     GENEVIEVE JACKSON  Yearly screening mammogram. Scattered areas of fibroglandular density.  BI-RADS Category 1: Negative.    2021 BILATERAL SCREENING MAMMO WITH JOANN          [facility]               GENEVIEVE JACKSON  Scattered areas of fibroglandular density. Possible area of architectural distortion seen in middle one-third central region of the right breast.  BI-RADS Category 0: Incomplete.    2021   RIGHT DIAGNOSTIC MMG WITH JOANN & RIGHT BREAST US    [facility]    GENEVIEVE JACKSON  MMG:  Scattered areas of fibroglandular density.  Finding 1. Right breast, central. There is an area of architectural distortion measuring approximately 10 to 20 mm in the middle one-third central region of the right  breast.  Finding 2. Focal asymmetry in the anterior one-third upper outer region of the right breast.  US:  Finding 1. There is no sonographic correlate.  Finding 2. There is no sonographic correlate.  IMPRESSION:  Finding 1. Suspicious.  Finding 2. Suspicious.  BI-RADS Category 4B.    04/30/2021 RIGHT BREAST US             [facility]                    GENEVIEVE JACKSON  CURRENT COMPLAINT/SYMPTOMS:  Seen for palpable lump.  FINDINGS:  There is an irregular large area of mixed echogenicity with poorly defined margins seen in the upper outer region of the right breast. This area is most consistent with a hematoma that extends from the anterior upper outer and lateral region, deeper into the central breast. Hematoma indicated by minicork shaped marker clip.  On current ultrasound images this measures 6.7 x 5.9 x 4.4 cm. The central biopsy site, t-shaped clip, site of biopsy identified radial scar, demonstrated a smaller hematoma, and that biopsy site is currently obscured by the larger hematoma.  BI-RADS Category 2.    Pathology:  04/05/2021 STEREOTACTIC BIOPSIES                              [facility]                    GENEVIEVE JACKSON  Right breast. 13 core specimens. 9 gauge Eviva. Top hat shaped s-marco Eviva biopsy clip was deployed. A two view mammogram shows the clip in the expected location. Pathology returned as radial scar. Pathology is benign but higher risk and concordant.  Right breast. 10 core specimens. 9 gauge Eviva. Mini cork shaped s-marco Eviva biopsy clip was deployed. A two view mammogram shows the clip in expected location. There is a prominent hematoma at this site measuring 4 to 5 cm. Pathology returned as fibrocystic changes and florid hyperplasia. Pathology is benign and concordant.    04/05/2021 PATHOLOGY REPORT      GWEN JACKSON  1. Right Distal Center:  Radial scar.  2. Right Upper Outer Quadrant:  Fibrocystic changes with florid hyperplasia. Negative for  carcinoma.    Procedures:      Assessment:   Diagnosis Plan   1. Radial scar of right breast  HYDROcodone-acetaminophen (NORCO) 5-325 MG per tablet   2. Abnormal mammogram     3. Fibrocystic disease of right breast     4. Hematoma of right breast  HYDROcodone-acetaminophen (NORCO) 5-325 MG per tablet   5. FH: breast cancer     6. Class 1 obesity due to excess calories without serious comorbidity with body mass index (BMI) of 31.0 to 31.9 in adult     7. Yeast vaginitis     1-2  RIGHT breast 10-20mm, mid third central architectural distortion with no US correlate-  Radial scar- tophat marker good position.      2-3  RIGHT UOQ- CORK marker- FCC, concordant and good position marker- 6.7 cm hematoma post biopsy    4-  RIGHT breast 6.7 cm hematoma at the benign biopsy site with cork marker    5-  Mother age 67  [employer] Breast surgery lifetime risk- 20-34%    MBS 5 year risk: 4-7%    6-  BMI 31  - 30 # weight gain with menopause    7-  Reports yeast infection on keflex from hematoma    Plan:  THe patient goes by Nabil.    We reviewed her imaging, history, exam, pathology, and family history together today.     We reviewed the nature of radial scar and that we prefer to excise these to rule out a pathologic upgrade i.e. that we could have missed a cancer on the core biopsy and also to alleviate be abnormal imaging finding for the radiologist next interpretation.  We discussed the procedure of a right breast needle localized excision of radial scar.  We discussed the alternative of not excising.  We were both in agreement on excision.  I recommended doing this towards the end of August or September to give her hematoma time to resolve.  Based on her work she preferred to do this in November.    Her most significant risk factors for breast cancer include the following:nulliparity, family history, obesity.    I calculated her lifetime risk of breast cancer using the [employer] of breast surgeons risk assessment as: 20-34% and 5  year risk as 4-7%.    With a lifetime risk of breast cancer greater than 20%, the current recommendations for screening include annual mammography and annual MRI, with clinical examination. She is interested in pursuing this, but wants to wait until her next mammogram is due in 2-6-22- [facility], so will plan for [facility] MRI at same time.    We also discussed that for a 5 year risk greater than 1.7% or LCIS, and a life expectancy > 10 years, that we recommend risk reduction counseling with the medical oncologists about chemopreventive agents such as tamoxifen, raloxifene, or exemestane.  She was not interested in pursuing thisand is going to work on a healthy diet and exercise.  SHe politely declined an offer to speak with our nutritionist as well.    We will see her back in November for her procedure.    I have called in her Rx and asked her to pick these up.    Recommended supportive postop bra and continued warm compresses to breast. We showed her examples of and fitted her for a postop bra today. We discussed that it may take 4-6 months for the hematoma to resolve. THis was fortunately at the benign biopsy site rather than at the radial scar.    With regards to the yeast infection, I have called in diflucan 150 mg and asked her to take a BID probiotic.    Cha Mane MD        We have spent 60 minutes in face to face visit today, 45 in face to face .      Next Appointment:  Return for surgery.      EMR Dragon/transcription disclaimer:    Much of this encounter note is an electronic transcription/translocation of spoken language to printed text.  The electronic translation of spoken language may permit erroneous, or at times, nonsensical words or phrases to be inadvertently transcribed.  Although I have reviewed the note from such areas, some may still exist.

## 2021-05-03 NOTE — TELEPHONE ENCOUNTER
Patient refused to sign her surgery consent. She will let me know when she is ready to schedule surgery.    Mony

## 2021-05-13 ENCOUNTER — TELEPHONE (OUTPATIENT)
Dept: SURGERY | Facility: CLINIC | Age: 61
End: 2021-05-13

## 2021-05-13 DIAGNOSIS — T14.8XXA HEMATOMA: Primary | ICD-10-CM

## 2021-05-13 NOTE — TELEPHONE ENCOUNTER
[facility] requested order for cyst aspiration rather than abscess aspiration. Order entered.

## 2021-05-14 ENCOUNTER — TELEPHONE (OUTPATIENT)
Dept: SURGERY | Facility: CLINIC | Age: 61
End: 2021-05-14

## 2021-05-14 NOTE — TELEPHONE ENCOUNTER
Patient called back, she is not ready for me to put her surgery on. She said she just went back to work part time. She will call me when she is ready.    Mony

## 2021-05-14 NOTE — TELEPHONE ENCOUNTER
LM for patient to call me, wanting to know if she is ready for me to schedule her surgery.    Mony

## 2021-05-17 ENCOUNTER — TELEPHONE (OUTPATIENT)
Dept: SURGERY | Facility: CLINIC | Age: 61
End: 2021-05-17

## 2021-05-27 ENCOUNTER — TELEPHONE (OUTPATIENT)
Dept: SURGERY | Facility: CLINIC | Age: 61
End: 2021-05-27

## 2021-05-27 NOTE — TELEPHONE ENCOUNTER
Mailed letter to patient, to see if she ready to schedule her surgery.    I scanned in letter.  Mony

## 2021-06-02 ENCOUNTER — TELEPHONE (OUTPATIENT)
Dept: SURGERY | Facility: CLINIC | Age: 61
End: 2021-06-02

## 2021-06-02 NOTE — TELEPHONE ENCOUNTER
Dr. Mane saw patient in office 5/3/21. She had a hematoma following right breast biopsy at [facility]. We saw patient for the hematoma and the radial scar. Patient has not scheduled surgery for radial scar yet.   -She called on 5/13 because hematoma started leaking through biopsy site. We instructed her to keep site clean covered and dry.   -She called on 5/17 to let us know site was still draining blood. Instructions were the same, keep covered and dry, continue to wear compression bra.   -Patient calls today to let us know right breast hematoma is still bleeding. Patient keeping clean, covered and dry, but still having quite a bit of dark bloody discharge. Would like to know what to do.         I called to let patient know imaging is necessary.  That this is actually a good thing, because it means that the old blood from the hematoma is draining and that the hematoma will resolve sooner.   I would like to reassure her that this is not active new blood, this is old blood.  Continue current management, if she is having to change the gauze too often, she can try a maxi pad.  We realize this is frustrating for her, however it will eventually stop. I had to leave her a message to call me back.

## 2021-06-30 ENCOUNTER — TELEPHONE (OUTPATIENT)
Dept: SURGERY | Facility: CLINIC | Age: 61
End: 2021-06-30

## 2021-07-13 ENCOUNTER — APPOINTMENT (OUTPATIENT)
Dept: LAB | Facility: HOSPITAL | Age: 61
End: 2021-07-13

## 2021-08-03 NOTE — PROGRESS NOTES
New left Knee      Patient: Olga Laguna        YOB: 1960    Medical Record Number: [id number]        Chief Complaints: left knee pain      History of Present Illness: This is a 61-year-old female who presents complaining of left knee pain she states she is had a hard year with a breast biopsy with a huge hematoma and 3 weeks ago she was finally getting back to her exercise and all of a sudden her knee just started swelling.  The knee hurts and also she is had a lot of lower extremity swelling.  No particular history injury no change in activity symptoms are mild to moderate really only pain is with walking better swelling is significant 5 out of 10 grinding aching giving away worse with walking standing climbing stairs somewhat better with rest past medical history is remarkable for endometriosis        Allergies:   Allergies   Allergen Reactions   • Latex Rash     blisters   • Codeine GI Intolerance       Medications:   Home Medications:  Current Outpatient Medications on File Prior to Visit   Medication Sig   • Cholecalciferol (VITAMIN D3) 125 MCG (5000 UT) capsule capsule Take 5,000 Units by mouth Daily.   • vitamin B-12 (CYANOCOBALAMIN) 1000 MCG tablet Take 1,000 mcg by mouth Daily.     No current facility-administered medications on file prior to visit.     Current Medications:  Scheduled Meds:  Continuous Infusions:No current facility-administered medications for this visit.    PRN Meds:.    Past Medical History:   Diagnosis Date   • H/O endometritis    • Hearing loss    • Hematoma    • Radial scar of right breast    • Vitamin D deficiency         Past Surgical History:   Procedure Laterality Date   • BREAST BIOPSY     • BREAST BIOPSY     • DIAGNOSTIC LAPAROSCOPY     • SKIN BIOPSY      pre-melanoma        Social History     Occupational History     Employer: [employer]   Tobacco Use   • Smoking status: Never Smoker   • Smokeless tobacco: Never Used   Vaping Use   • Vaping Use: Never  "used   Substance and Sexual Activity   • Alcohol use: No   • Drug use: No   • Sexual activity: Defer      Social History     Social History Narrative   • Not on file        Family History   Problem Relation Age of Onset   • Breast cancer Mother 67   • Basal cell carcinoma Mother    • Cancer Father         bladder cancer              Review of Systems: 14 point review of systems are remarkable for the knee pain and swelling only the remainder negative per the patient    Review of Systems      Physical Exam: 61 y.o. female  General Appearance:    Alert, cooperative, in no acute distress                   Vitals:    08/05/21 1559   Temp: 97.8 °F (36.6 °C)   Weight: 75.8 kg (167 lb)   Height: 157.5 cm (62\")   PainSc:   3      Patient is alert and read ×3 no acute distress appears her above-listed at height weight and age.  Affect is normal respiratory rate is normal unlabored. Heart rate regular rate rhythm, sclera, dentition and hearing are normal for the purpose of this exam.        Ortho Exam  Physical exam of the left knee reveals no effusion no redness.  The patient does have tenderness about the medial joint line.  No tenderness about the lateral joint line.  A negative bounce home and a positive medial Abdias.  There is some pain medially  with a lateral Abdias.  Patient has a stable ligamentous exam.  Quads are reasonable and symmetric bilaterally.  Calf is soft and nontender.  There is no overlying skin changes she does have pretty significant swelling in the lower leg down to the ankle and the foot very mild calf tenderness patient has good hip range of motion full symmetric and asymptomatic and a normal ankle exam.  Large Joint Arthrocentesis: L knee  Date/Time: 8/5/2021 4:39 PM  Consent given by: patient  Site marked: site marked  Timeout: Immediately prior to procedure a time out was called to verify the correct patient, procedure, equipment, support staff and site/side marked as required   Supporting " Documentation  Indications: pain   Procedure Details  Location: knee - L knee  Preparation: Patient was prepped and draped in the usual sterile fashion  Needle size: 22 G  Approach: anteromedial  Medications administered: 80 mg methylPREDNISolone acetate 80 MG/ML; 4 mL lidocaine PF 1% 1 %  Patient tolerance: patient tolerated the procedure well with no immediate complications                   Radiology:   AP, Lateral and merchant views of the left knee  were ordered/reviewed to evauateknee pain.  I have no comparative films these are normal perhaps some very mild patellofemoral OA otherwise no acute bony pathology  Imaging Results (Most Recent)     Procedure Component Value Units Date/Time    XR Knee 3 View Left [[id number]] Resulted: 08/05/21 1426     Updated: 08/05/21 1530    Impression:      Ordering physician's impression is located in the Encounter Note dated 08/05/21. X-ray performed in the DR room.          Assessment/Plan:      Left knee pain in my differential would be meniscal pathology however with her swelling I am worried about a DVT as is she.  We will get a Doppler today if that is negative I want her to get a compression hose on for all the time when she is at work.  I think an injection today in her knee is quite reasonable as a diagnostic and therapeutic tool I would give this 2 to 3 weeks with some additional quad and core strengthening if she fails to improve with that would pursue other means of testing

## 2021-08-05 ENCOUNTER — OFFICE VISIT (OUTPATIENT)
Dept: ORTHOPEDIC SURGERY | Facility: CLINIC | Age: 61
End: 2021-08-05

## 2021-08-05 ENCOUNTER — HOSPITAL ENCOUNTER (OUTPATIENT)
Dept: CARDIOLOGY | Facility: HOSPITAL | Age: 61
Discharge: HOME | End: 2021-08-05
Admitting: ORTHOPAEDIC SURGERY

## 2021-08-05 VITALS — BODY MASS INDEX: 30.73 KG/M2 | WEIGHT: 167 LBS | HEIGHT: 62 IN | TEMPERATURE: 97.8 F

## 2021-08-05 DIAGNOSIS — S83.242A TEAR OF MEDIAL MENISCUS OF LEFT KNEE, CURRENT, UNSPECIFIED TEAR TYPE, INITIAL ENCOUNTER: ICD-10-CM

## 2021-08-05 DIAGNOSIS — M25.562 LEFT KNEE PAIN, UNSPECIFIED CHRONICITY: Primary | ICD-10-CM

## 2021-08-05 DIAGNOSIS — M79.89 PAIN AND SWELLING OF LEFT LOWER LEG: ICD-10-CM

## 2021-08-05 DIAGNOSIS — M79.662 PAIN AND SWELLING OF LEFT LOWER LEG: ICD-10-CM

## 2021-08-05 PROCEDURE — 99213 OFFICE O/P EST LOW 20 MIN: CPT | Performed by: ORTHOPAEDIC SURGERY

## 2021-08-05 PROCEDURE — 20610 DRAIN/INJ JOINT/BURSA W/O US: CPT | Performed by: ORTHOPAEDIC SURGERY

## 2021-08-05 PROCEDURE — 93971 EXTREMITY STUDY: CPT

## 2021-08-05 PROCEDURE — 73562 X-RAY EXAM OF KNEE 3: CPT | Performed by: ORTHOPAEDIC SURGERY

## 2021-08-05 RX ADMIN — LIDOCAINE HYDROCHLORIDE 4 ML: 10 INJECTION, SOLUTION EPIDURAL; INFILTRATION; INTRACAUDAL; PERINEURAL at 16:39

## 2021-08-05 RX ADMIN — METHYLPREDNISOLONE ACETATE 80 MG: 80 INJECTION, SUSPENSION INTRA-ARTICULAR; INTRALESIONAL; INTRAMUSCULAR; SOFT TISSUE at 16:39

## 2021-08-05 NOTE — PROGRESS NOTES
Today's preliminary report. Left lower extremity venous Doppler is negative for DVT. Report called to Pastora Ray MD. Said to give results to patient and let patient leave.

## 2021-08-06 LAB
BH CV LOWER VASCULAR LEFT COMMON FEMORAL AUGMENT: NORMAL
BH CV LOWER VASCULAR LEFT COMMON FEMORAL COMPETENT: NORMAL
BH CV LOWER VASCULAR LEFT COMMON FEMORAL COMPRESS: NORMAL
BH CV LOWER VASCULAR LEFT COMMON FEMORAL PHASIC: NORMAL
BH CV LOWER VASCULAR LEFT COMMON FEMORAL SPONT: NORMAL
BH CV LOWER VASCULAR LEFT DISTAL FEMORAL COMPRESS: NORMAL
BH CV LOWER VASCULAR LEFT GASTRONEMIUS COMPRESS: NORMAL
BH CV LOWER VASCULAR LEFT GREATER SAPH AK COMPRESS: NORMAL
BH CV LOWER VASCULAR LEFT GREATER SAPH BK COMPRESS: NORMAL
BH CV LOWER VASCULAR LEFT LESSER SAPH COMPRESS: NORMAL
BH CV LOWER VASCULAR LEFT MID FEMORAL AUGMENT: NORMAL
BH CV LOWER VASCULAR LEFT MID FEMORAL COMPETENT: NORMAL
BH CV LOWER VASCULAR LEFT MID FEMORAL COMPRESS: NORMAL
BH CV LOWER VASCULAR LEFT MID FEMORAL PHASIC: NORMAL
BH CV LOWER VASCULAR LEFT MID FEMORAL SPONT: NORMAL
BH CV LOWER VASCULAR LEFT PERONEAL COMPRESS: NORMAL
BH CV LOWER VASCULAR LEFT POPLITEAL AUGMENT: NORMAL
BH CV LOWER VASCULAR LEFT POPLITEAL COMPETENT: NORMAL
BH CV LOWER VASCULAR LEFT POPLITEAL COMPRESS: NORMAL
BH CV LOWER VASCULAR LEFT POPLITEAL PHASIC: NORMAL
BH CV LOWER VASCULAR LEFT POPLITEAL SPONT: NORMAL
BH CV LOWER VASCULAR LEFT POSTERIOR TIBIAL COMPRESS: NORMAL
BH CV LOWER VASCULAR LEFT PROFUNDA FEMORAL COMPRESS: NORMAL
BH CV LOWER VASCULAR LEFT PROXIMAL FEMORAL COMPRESS: NORMAL
BH CV LOWER VASCULAR LEFT SAPHENOFEMORAL JUNCTION COMPRESS: NORMAL
BH CV LOWER VASCULAR RIGHT COMMON FEMORAL AUGMENT: NORMAL
BH CV LOWER VASCULAR RIGHT COMMON FEMORAL COMPETENT: NORMAL
BH CV LOWER VASCULAR RIGHT COMMON FEMORAL COMPRESS: NORMAL
BH CV LOWER VASCULAR RIGHT COMMON FEMORAL PHASIC: NORMAL
BH CV LOWER VASCULAR RIGHT COMMON FEMORAL SPONT: NORMAL

## 2021-08-06 RX ORDER — METHYLPREDNISOLONE ACETATE 80 MG/ML
80 INJECTION, SUSPENSION INTRA-ARTICULAR; INTRALESIONAL; INTRAMUSCULAR; SOFT TISSUE
Status: COMPLETED | OUTPATIENT
Start: 2021-08-05 | End: 2021-08-05

## 2021-08-06 RX ORDER — LIDOCAINE HYDROCHLORIDE 10 MG/ML
4 INJECTION, SOLUTION EPIDURAL; INFILTRATION; INTRACAUDAL; PERINEURAL
Status: COMPLETED | OUTPATIENT
Start: 2021-08-05 | End: 2021-08-05

## 2021-10-15 ENCOUNTER — LAB (OUTPATIENT)
Dept: LAB | Facility: HOSPITAL | Age: 61
End: 2021-10-15

## 2021-10-15 ENCOUNTER — CONSULT (OUTPATIENT)
Dept: ONCOLOGY | Facility: CLINIC | Age: 61
End: 2021-10-15

## 2021-10-15 ENCOUNTER — TELEPHONE (OUTPATIENT)
Dept: ORTHOPEDIC SURGERY | Facility: CLINIC | Age: 61
End: 2021-10-15

## 2021-10-15 VITALS
RESPIRATION RATE: 18 BRPM | TEMPERATURE: 97.5 F | WEIGHT: 176.9 LBS | SYSTOLIC BLOOD PRESSURE: 114 MMHG | HEIGHT: 62 IN | BODY MASS INDEX: 32.55 KG/M2 | DIASTOLIC BLOOD PRESSURE: 76 MMHG | OXYGEN SATURATION: 98 % | HEART RATE: 69 BPM

## 2021-10-15 DIAGNOSIS — D69.9 BLEEDING DIATHESIS (HCC): ICD-10-CM

## 2021-10-15 DIAGNOSIS — D69.9 BLEEDING DIATHESIS (HCC): Primary | ICD-10-CM

## 2021-10-15 DIAGNOSIS — T14.8XXA HEMATOMA: ICD-10-CM

## 2021-10-15 DIAGNOSIS — M25.562 LEFT KNEE PAIN, UNSPECIFIED CHRONICITY: Primary | ICD-10-CM

## 2021-10-15 DIAGNOSIS — N64.89 RADIAL SCAR OF RIGHT BREAST: ICD-10-CM

## 2021-10-15 LAB
CLOSURE TME COLL+ADP BLD: 70 SECONDS (ref 52–122)
CLOSURE TME COLL+EPINEP BLD: 99 SECONDS (ref 68–148)

## 2021-10-15 PROCEDURE — 36415 COLL VENOUS BLD VENIPUNCTURE: CPT

## 2021-10-15 PROCEDURE — 85576 BLOOD PLATELET AGGREGATION: CPT | Performed by: INTERNAL MEDICINE

## 2021-10-15 PROCEDURE — 99205 OFFICE O/P NEW HI 60 MIN: CPT | Performed by: INTERNAL MEDICINE

## 2021-10-15 NOTE — TELEPHONE ENCOUNTER
Please let the patient know I think there are 2 options here.  If she did not get relief from the injection or it was temporary I would probably get an MRI first before starting into any physical therapy.  Dr. Ray was concerned about a meniscus tear at the time of their visit.    The other option is that if she really wishes to avoid surgery and or her symptoms are not too debilitating we could certainly try physical therapy first before proceeding with any additional testing.    If she is having a lot of pain and difficulty I would be happy to put the order in for Dr. Ray.

## 2021-10-15 NOTE — TELEPHONE ENCOUNTER
Caller: GENEVIEVE  Relationship to Patient: SELF    Phone Number: [phone](WORK)    Reason for Call: PT CALLED STATING THAT AT HER LAST VISIT DR. LAM ADVISED HER THAT IF HER LEFT KNEE IS STILL NOT HEALING THAT IT WOULD BE WISE TO LOOK AT FURTHER TREATMENT. PT STATES THAT THEY DISCUSSED A MRI. PT WOULD LIKE TO MOVE FORWARD WITH MRI DUE TO HER KNEE NOT HEALING. PT WOULD ALSO LIKE TO DISCUSS IF PHYSICAL THERAPY WOULD BE A GOOD OPTION AND IF SO SHE WOULD LIKE TO GO TO [place]. PT STATES FOR INSURANCE PURPOSES SHE WOULD LIKE TO GET THE ABOVE TREATMENTS SCHEDULED PRIOR TO January 2022 IF ABOVE TREATMENTS ARE ADVISABLE. PLEASE CONTACT PT AT ABOVE PHONE NUMBERS.

## 2021-10-15 NOTE — TELEPHONE ENCOUNTER
Spoke with patient.  She wants to move forward with mri.  Order was placed.  She was given [facility] scheduling number.  Patient was asked to call back for mri follow up with dr jackson a few days after her mri.

## 2021-10-15 NOTE — PROGRESS NOTES
Subjective     REASON FOR CONSULTATION: Radial scar right breast with exuberant postbiopsy hematoma in 4/21 and high risk for breast cancer  Provide an opinion on any further workup or treatment                             REQUESTING PHYSICIAN: Clara Pallares MD Allison Hatmaker, MD    RECORDS OBTAINED:  Records of the patients history including those obtained from the referring provider were reviewed and summarized in detail.    HISTORY OF PRESENT ILLNESS:  The patient is a 61 y.o. year old female who is here for an opinion about the above issue.    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  Diagnostic imaging He clearly told there is Sheila to take the monoclonal ride to showed an area of architectural distortion measuring 10 to 20 mm in the middle one third of the central region of the right breast and ultrasound showed no ultrasonic correlates secondary in the anterior one third of the upper outer region of the right breast was also suspicious and biopsy of these 2 areas was recommended  The patient underwent stereotactic biopsy of these 2 lesions on 4/5/2021 and unfortunately had immediate postbiopsy hematoma that was extremely large in the right breast and very painful. ( Patient showed me photographs on her phone)  The final pathology showedRadial scar in the central breast and the right upper outer quadrant showed fibrocystic change with florid hyperplasia    The patient was recommended to have excision of this area of radial scar but because of the huge hematoma which subsequently ruptured and had significant bleeding issue she is very understandably worried about any further surgeries on her right breast.  The breast remains tender and discolored but much  less swollen than when this first happened.    She is also been recommended to have prevention because her risk profile shows a greater than 20% lifetime risk of breast cancer by multiple different models including the Nancy Logan the mastery of surgery profile and she has been reluctant to do this.    Genetic testing was also offered and she is considering this    She is  1 para 0 with a miscarriage  Menarche was at 14 menopause at 47.  She has been on no hormones after menopause  She has had a previous breast biopsy on the left without any associated bleeding- path report is unknown    She had heavy periods for quite a while as a young adult but no nosebleeds or easy bruising.  She has had no major surgeries to test her hemostasis in the past and has had teeth extracted without undue bleeding  No family history of bleeding disorders-von Willebrand profile PT and PTT were drawn and normal  She was not on any nonsteroidals or SSRIs before the biopsy    Family history is positive for mother with breast cancer at age 62 there is no other malignancy in the family she has 2 sisters who are healthy    She is not a smoker or drinker    Vaccinated against COVID-19 in 2021    Past Medical History:   Diagnosis Date   • H/O endometritis    • Hearing loss    • Hematoma    • Radial scar of right breast    • Vitamin D deficiency         Past Surgical History:   Procedure Laterality Date   • BREAST BIOPSY     • BREAST BIOPSY     • DIAGNOSTIC LAPAROSCOPY     • SKIN BIOPSY      pre-melanoma        Current Outpatient Medications on File Prior to Visit   Medication Sig Dispense Refill   • Cholecalciferol (VITAMIN D3) 125 MCG (5000 UT) capsule capsule Take 5,000 Units by mouth Daily.     • vitamin B-12 (CYANOCOBALAMIN) 1000 MCG tablet Take 1,000 mcg by mouth Daily.       No current facility-administered medications on file prior to visit.        ALLERGIES:    Allergies   Allergen Reactions   • Latex Rash     blisters  "  • Codeine GI Intolerance        Social History     Socioeconomic History   • Marital status: Unknown   Tobacco Use   • Smoking status: Never Smoker   • Smokeless tobacco: Never Used   Vaping Use   • Vaping Use: Never used   Substance and Sexual Activity   • Alcohol use: No   • Drug use: No   • Sexual activity: Defer        Family History   Problem Relation Age of Onset   • Breast cancer Mother 67   • Basal cell carcinoma Mother    • Cancer Father         bladder cancer         Review of Systems   Constitutional: Negative.    Respiratory: Negative.    Cardiovascular: Negative.    Gastrointestinal: Negative.    Endocrine: Positive for cold intolerance.   Genitourinary: Negative.    Musculoskeletal: Negative.    Skin: Positive for color change (Hematoma right breast).   Neurological: Negative.    Hematological: Negative.    Psychiatric/Behavioral: Negative.         Objective     Vitals:    10/15/21 1503   BP: 114/76   Pulse: 69   Resp: 18   Temp: 97.5 °F (36.4 °C)   TempSrc: Temporal   SpO2: 98%   Weight: 80.2 kg (176 lb 14.4 oz)   Height: 157.5 cm (62.01\")   PainSc: 0-No pain     Current Status 10/15/2021   ECOG score 0       Physical Exam      CONSTITUTIONAL:  Vital signs reviewed.  No distress, looks comfortable.  EYES:  Conjunctivae and lids unremarkable.  PERRLA  EARS,NOSE,MOUTH,THROAT:  Ears and nose appear unremarkable.  Lips, teeth, gums appear unremarkable.  RESPIRATORY:  Normal respiratory effort.  Lungs clear to auscultation bilaterally.  CARDIOVASCULAR:  Normal S1, S2.  No murmurs rubs or gallops.  No significant lower extremity edema.  GASTROINTESTINAL: Abdomen appears unremarkable.  Nontender.  No hepatomegaly.  No splenomegaly.  LYMPHATIC:  No cervical, supraclavicular, axillary lymphadenopathy.  SKIN:  Warm.  No rashes.  PSYCHIATRIC:  Normal judgment and insight.  Normal mood and affect.      RECENT LABS:  Hematology No results found for: WBC, RBC, HGB, HCT, PLT       Assessment/Plan   1.  Radial scar " right breast post biopsy with gigantic hematoma-patient reluctant to have excision based on this bad experience in 4/21  · Previous biopsy left breast without bleeding no atypia  · Declines excisional because of bleeding with first biopsy    2.  No prior bleeding history with a negative von Willebrand profile normal PT PTT and platelet count  · No family history of bleeding disorder  · No major surgeries to test her hemostasis in the past    3.  High risk for breast cancer based on family history 2 previous biopsies-Tyashutosh Alvaradoick risk model shows 31% lifetime risk  · Declines tamoxifen 5 mg at this time    4.  Vaccinated against COVID-19    Plan  1.  Check platelet function with PFA-100  2, she is very opposed to surgery at this point in I think it is fair to wait until April when her repeat imaging including MRI scheduled to see if things have changed.  3.  I suggested a visit to the genetic counselor because if she has a defined mutation she may be more inclined to proceed with biopsy  4.  She is wanting to hold on prevention until her imaging in April and then make a decision  5.  Return in April after imaging to see me to discuss the above issues and review her genetic profile    At this time she realizes that radial scar is low risk of being upgraded at excision but the absence of excision she is willing to take the risk of observation for now until April and then make a decision at that time  I feel the bleeding is a local problem and not associated with a hereditary or generalized bleeding disorder and I told her she should be at no risk for bleeding in a controlled environment at surgery but she is still very apprehensive    I spent 60 total minutes, face-to-face, caring for Olga today.  Greater than 50% of this time involved counseling and/or coordination of care as documented within this note.

## 2021-10-20 ENCOUNTER — TELEPHONE (OUTPATIENT)
Dept: ONCOLOGY | Facility: CLINIC | Age: 61
End: 2021-10-20

## 2021-10-20 NOTE — TELEPHONE ENCOUNTER
Oncology Social Work  Distress Screen Follow Up    OSW received referral for pt due to distress screen referral of 8/10. OSW provided pt with information regarding her role and support services available. Pt reports that she had a very positive experience during her first appt with Dr. Henderson, for which she was very grateful.  Pt described to OSW the incredibly challenging experience she had with her hematoma caused by her biopsy in the spring and how this impacted her. Pt lives alone and prides herself on her independence. She was debilitated for weeks, and reports that this, combined with the challenges and isolation of Covid led her to emotional depths she had never previously experienced. OSW acknowledged and validated pt's trauma from the experience and her hesitancy for any other excisions in the near future. Pt reports that she still has pain in the area, even after all this time.  Pt expressed her gratitude for OSW's call and for the support. Pt asked that OSW send her contact information to pt via email, which OSW did. Pt denied any additional needs at this time. OSW will remain available for future needs and follow up.     Esmer Stover, MAYURW  Oncology Social Worker

## 2021-11-10 ENCOUNTER — HOSPITAL ENCOUNTER (OUTPATIENT)
Dept: MRI IMAGING | Facility: HOSPITAL | Age: 61
Discharge: HOME | End: 2021-11-10
Admitting: ORTHOPAEDIC SURGERY

## 2021-11-10 DIAGNOSIS — M25.562 LEFT KNEE PAIN, UNSPECIFIED CHRONICITY: ICD-10-CM

## 2021-11-10 PROCEDURE — 73721 MRI JNT OF LWR EXTRE W/O DYE: CPT

## 2021-11-23 ENCOUNTER — OFFICE VISIT (OUTPATIENT)
Dept: ORTHOPEDIC SURGERY | Facility: CLINIC | Age: 61
End: 2021-11-23

## 2021-11-23 VITALS — BODY MASS INDEX: 31.28 KG/M2 | TEMPERATURE: 97.3 F | HEIGHT: 62 IN | WEIGHT: 170 LBS

## 2021-11-23 DIAGNOSIS — S83.242D TEAR OF MEDIAL MENISCUS OF LEFT KNEE, CURRENT, UNSPECIFIED TEAR TYPE, SUBSEQUENT ENCOUNTER: Primary | ICD-10-CM

## 2021-11-23 PROCEDURE — 99213 OFFICE O/P EST LOW 20 MIN: CPT | Performed by: ORTHOPAEDIC SURGERY

## 2021-11-23 NOTE — PROGRESS NOTES
Patient: Olga Laguna  YOB: 1960  Date of Service: 11/23/2021    Chief Complaints: Left knee pain    Subjective:    History of Present Illness: Pt is seen in the office today with complaints of left knee pain she does have some signs and symptoms that are consistent with meniscal pathology her symptoms fail to respond so an MRI was done she is here with the MRI today MRI demonstrates an oblique tear of the medial meniscus as well as some degenerative changes. We have a long discussion regarding the meniscus itself regarding things she can and can't do. For the most part I told her her pain is her guide talked about meniscus tears the natural history of those the anatomy of the meniscus and the associated lack of blood supply. She understands that we talked about a repeat injection talked about arthroscopy she would like to try some physical therapy first to see if that allows her any relief if it does not then we will proceed with arthroscopy we did discuss that today to.          Allergies:   Allergies   Allergen Reactions   • Latex Rash     blisters   • Codeine GI Intolerance       Medications:   Home Medications:  Current Outpatient Medications on File Prior to Visit   Medication Sig   • Cholecalciferol (VITAMIN D3) 125 MCG (5000 UT) capsule capsule Take 5,000 Units by mouth Daily.   • vitamin B-12 (CYANOCOBALAMIN) 1000 MCG tablet Take 1,000 mcg by mouth Daily.     No current facility-administered medications on file prior to visit.     Current Medications:  Scheduled Meds:  Continuous Infusions:No current facility-administered medications for this visit.    PRN Meds:.    I have reviewed the patient's medical history in detail and updated the computerized patient record.  Review and summarization of old records include:    Past Medical History:   Diagnosis Date   • H/O endometritis    • Hearing loss    • Hematoma    • Infectious mononucleosis    • Radial scar of right breast    • Vitamin D  deficiency         Past Surgical History:   Procedure Laterality Date   • BREAST BIOPSY     • BREAST BIOPSY     • DIAGNOSTIC LAPAROSCOPY     • SKIN BIOPSY      pre-melanoma        Social History     Occupational History     Employer: [employer]   Tobacco Use   • Smoking status: Never Smoker   • Smokeless tobacco: Never Used   Vaping Use   • Vaping Use: Never used   Substance and Sexual Activity   • Alcohol use: No   • Drug use: No   • Sexual activity: Defer      Social History     Social History Narrative   • Not on file        Family History   Problem Relation Age of Onset   • Breast cancer Mother 67   • Basal cell carcinoma Mother    • Cancer Father         bladder cancer        ROS: 14 point review of systems was performed and was negative except for documented findings in HPI and today's encounter.     Allergies:   Allergies   Allergen Reactions   • Latex Rash     blisters   • Codeine GI Intolerance     Constitutional:  Denies fever, shaking or chills   Eyes:  Denies change in visual acuity   HENT:  Denies nasal congestion or sore throat   Respiratory:  Denies cough or shortness of breath   Cardiovascular:  Denies chest pain or severe LE edema   GI:  Denies abdominal pain, nausea, vomiting, bloody stools or diarrhea   Musculoskeletal:  Numbness, tingling, or loss of motor function only as noted above in history of present illness.  : Denies painful urination or hematuria  Integument:  Denies rash, lesion or ulceration   Neurologic:  Denies headache or focal weakness  Endocrine:  Denies lymphadenopathy  Psych:  Denies confusion or change in mental status   Hem:  Denies active bleeding      Physical Exam: 61 y.o. female  Wt Readings from Last 3 Encounters:   10/15/21 80.2 kg (176 lb 14.4 oz)   08/05/21 75.8 kg (167 lb)   05/03/21 78 kg (172 lb)       There is no height or weight on file to calculate BMI.  No height and weight on file for this encounter.  There were no vitals filed for this visit.  Vital  signs reviewed.   General Appearance:    Alert, cooperative, in no acute distress                    Ortho exam    Physical exam of the left knee reveals no effusion no redness.  The patient does have tenderness about the medial joint line.  No tenderness about the lateral joint line.  A negative bounce home and a positive medial Abdias.  There is some pain medially  with a lateral Abdias.  Patient has a stable ligamentous exam.  Quads are reasonable and symmetric bilaterally.  Calf is soft and nontender.  There is no overlying skin changes no lymphedema lymphadenopathy.  Patient has good hip range of motion full symmetric and asymptomatic and a normal ankle exam.         .time    Assessment: left knee pain she does have some signs and symptoms that are consistent with meniscal pathology her symptoms fail to respond so an MRI was done she is here with the MRI today MRI demonstrates an oblique tear of the medial meniscus as well as some degenerative changes. We have a long discussion regarding the meniscus itself regarding things she can and can't do. For the most part I told her her pain is her guide talked about meniscus tears the natural history of those the anatomy of the meniscus and the associated lack of blood supply. She understands that we talked about a repeat injection talked about arthroscopy she would like to try some physical therapy first to see if that allows her any relief if it does not then we will proceed with arthroscopy we did discuss that today to.        Plan:   Follow up as indicated.  Ice, elevate, and rest as needed.  Discussed conservative measures of pain control including ice, bracing.  Also talked about the importance of strengthening and maintaining ideal body weight    Pastora Ray M.D.

## 2022-04-22 ENCOUNTER — APPOINTMENT (OUTPATIENT)
Dept: LAB | Facility: HOSPITAL | Age: 62
End: 2022-04-22

## 2022-07-21 ENCOUNTER — APPOINTMENT (OUTPATIENT)
Dept: WOMENS IMAGING | Facility: HOSPITAL | Age: 62
End: 2022-07-21

## 2022-07-21 PROCEDURE — 77067 SCR MAMMO BI INCL CAD: CPT | Performed by: RADIOLOGY

## 2022-07-21 PROCEDURE — 77063 BREAST TOMOSYNTHESIS BI: CPT | Performed by: RADIOLOGY

## 2023-09-01 ENCOUNTER — APPOINTMENT (OUTPATIENT)
Dept: WOMENS IMAGING | Facility: HOSPITAL | Age: 63
End: 2023-09-01
Payer: COMMERCIAL

## 2023-09-01 PROCEDURE — 77067 SCR MAMMO BI INCL CAD: CPT | Performed by: RADIOLOGY

## 2023-09-01 PROCEDURE — 77063 BREAST TOMOSYNTHESIS BI: CPT | Performed by: RADIOLOGY

## 2023-12-27 VITALS
HEART RATE: 87 BPM | HEART RATE: 73 BPM | HEART RATE: 78 BPM | SYSTOLIC BLOOD PRESSURE: 111 MMHG | HEART RATE: 76 BPM | OXYGEN SATURATION: 97 % | SYSTOLIC BLOOD PRESSURE: 110 MMHG | SYSTOLIC BLOOD PRESSURE: 97 MMHG | RESPIRATION RATE: 19 BRPM | OXYGEN SATURATION: 98 % | TEMPERATURE: 97.5 F | HEART RATE: 85 BPM | HEART RATE: 98 BPM | SYSTOLIC BLOOD PRESSURE: 139 MMHG | DIASTOLIC BLOOD PRESSURE: 57 MMHG | WEIGHT: 178 LBS | HEART RATE: 81 BPM | OXYGEN SATURATION: 99 % | HEART RATE: 75 BPM | RESPIRATION RATE: 18 BRPM | SYSTOLIC BLOOD PRESSURE: 117 MMHG | DIASTOLIC BLOOD PRESSURE: 77 MMHG | RESPIRATION RATE: 16 BRPM | DIASTOLIC BLOOD PRESSURE: 59 MMHG | SYSTOLIC BLOOD PRESSURE: 113 MMHG | HEART RATE: 86 BPM | DIASTOLIC BLOOD PRESSURE: 53 MMHG | DIASTOLIC BLOOD PRESSURE: 72 MMHG | DIASTOLIC BLOOD PRESSURE: 54 MMHG | TEMPERATURE: 97.2 F | DIASTOLIC BLOOD PRESSURE: 68 MMHG | OXYGEN SATURATION: 100 % | OXYGEN SATURATION: 93 % | SYSTOLIC BLOOD PRESSURE: 158 MMHG | DIASTOLIC BLOOD PRESSURE: 62 MMHG | RESPIRATION RATE: 15 BRPM | HEIGHT: 62 IN | RESPIRATION RATE: 10 BRPM | RESPIRATION RATE: 17 BRPM | OXYGEN SATURATION: 96 % | DIASTOLIC BLOOD PRESSURE: 80 MMHG | SYSTOLIC BLOOD PRESSURE: 107 MMHG | SYSTOLIC BLOOD PRESSURE: 131 MMHG

## 2023-12-27 PROBLEM — Z86.010 SURVEILLANCE DUE TO PRIOR COLONIC NEOPLASIA: Status: ACTIVE | Noted: 2023-12-28

## 2023-12-27 PROBLEM — Z86.010 PERSONAL HISTORY OF COLONIC POLYPS: Status: ACTIVE | Noted: 2023-12-28

## 2023-12-28 ENCOUNTER — OFFICE (AMBULATORY)
Dept: URBAN - METROPOLITAN AREA PATHOLOGY 4 | Facility: PATHOLOGY | Age: 63
End: 2023-12-28

## 2023-12-28 ENCOUNTER — AMBULATORY SURGICAL CENTER (AMBULATORY)
Dept: URBAN - METROPOLITAN AREA SURGERY 17 | Facility: SURGERY | Age: 63
End: 2023-12-28
Payer: COMMERCIAL

## 2023-12-28 DIAGNOSIS — Z86.010 PERSONAL HISTORY OF COLONIC POLYPS: ICD-10-CM

## 2023-12-28 DIAGNOSIS — Z09 ENCOUNTER FOR FOLLOW-UP EXAMINATION AFTER COMPLETED TREATMEN: ICD-10-CM

## 2023-12-28 DIAGNOSIS — D12.3 BENIGN NEOPLASM OF TRANSVERSE COLON: ICD-10-CM

## 2023-12-28 PROBLEM — K63.5 POLYP OF COLON: Status: ACTIVE | Noted: 2023-12-28

## 2023-12-28 LAB
GI HISTOLOGY: A. UNSPECIFIED: (no result)
GI HISTOLOGY: PDF REPORT: (no result)

## 2023-12-28 PROCEDURE — 45385 COLONOSCOPY W/LESION REMOVAL: CPT | Mod: 33 | Performed by: INTERNAL MEDICINE

## 2023-12-28 PROCEDURE — 88305 TISSUE EXAM BY PATHOLOGIST: CPT | Performed by: INTERNAL MEDICINE

## 2023-12-28 NOTE — SERVICEHPINOTES
64 yo WF without GI complaints. Family history negative. She has a hx of adenomatous polyps and presents for a follow up colonoscopy.

## 2024-09-05 ENCOUNTER — APPOINTMENT (OUTPATIENT)
Dept: WOMENS IMAGING | Facility: HOSPITAL | Age: 64
End: 2024-09-05
Payer: COMMERCIAL

## 2024-09-05 PROCEDURE — 77067 SCR MAMMO BI INCL CAD: CPT | Performed by: RADIOLOGY

## 2024-09-05 PROCEDURE — 77063 BREAST TOMOSYNTHESIS BI: CPT | Performed by: RADIOLOGY
